# Patient Record
Sex: MALE | Race: OTHER | NOT HISPANIC OR LATINO | URBAN - METROPOLITAN AREA
[De-identification: names, ages, dates, MRNs, and addresses within clinical notes are randomized per-mention and may not be internally consistent; named-entity substitution may affect disease eponyms.]

---

## 2023-11-06 ENCOUNTER — EMERGENCY (EMERGENCY)
Facility: HOSPITAL | Age: 33
LOS: 1 days | Discharge: HOME | End: 2023-11-06
Attending: STUDENT IN AN ORGANIZED HEALTH CARE EDUCATION/TRAINING PROGRAM
Payer: SELF-PAY

## 2023-11-06 VITALS
WEIGHT: 216.49 LBS | SYSTOLIC BLOOD PRESSURE: 139 MMHG | TEMPERATURE: 98 F | DIASTOLIC BLOOD PRESSURE: 82 MMHG | RESPIRATION RATE: 18 BRPM | HEART RATE: 72 BPM | OXYGEN SATURATION: 98 %

## 2023-11-06 PROCEDURE — 99283 EMERGENCY DEPT VISIT LOW MDM: CPT

## 2023-11-06 PROCEDURE — T1013: CPT

## 2023-11-06 PROCEDURE — 99282 EMERGENCY DEPT VISIT SF MDM: CPT

## 2023-11-06 NOTE — ED PROVIDER NOTE - PHYSICAL EXAMINATION
Gen: Nontoxic, well appearing, in NAD.  Skin: Warm and dry as visualized.  Head: NC/AT.  Eyes: Subconjunctival hemorrhage b/l. PERRLA. EOMI.  Neck: Supple, FROM. Trachea midline.   Resp: No distress.  Cardio: Well perfused.  Ext: No deformities. MAEx4. FROM.   Neuro: A&Ox3. Appears nonfocal.   Psych: Normal affect and mood.

## 2023-11-06 NOTE — ED PROVIDER NOTE - OBJECTIVE STATEMENT
31 yo male no PMHx presents to ED c/o eye redness. Reports Saturday night vomited from throwing up. Now with b/l eye redness. No further complaints at this time.   Denies head injury.

## 2023-11-06 NOTE — ED PROVIDER NOTE - CLINICAL SUMMARY MEDICAL DECISION MAKING FREE TEXT BOX
33 yo male no PMHx presents to ED c/o eye redness s/p vomiting after drinking. Patient with subconjunctival hemorrhage. No injury. Medically stable for discharge.

## 2023-11-06 NOTE — ED ADULT TRIAGE NOTE - CHIEF COMPLAINT QUOTE
pt presents to ed and reports  drinking on saturday and threw up with "too much strength and now my eyes are red."  pt states that his head hurts when he looks up.  reports drinking once a week.  denies any vision changes.

## 2023-11-06 NOTE — ED PROVIDER NOTE - PATIENT PORTAL LINK FT
You can access the FollowMyHealth Patient Portal offered by [facility] by registering at the following website: [email or web address]. By joining [employer]’s FollowMyHealth portal, you will also be able to view your health information using other applications (apps) compatible with our system.

## 2023-11-06 NOTE — ED PROVIDER NOTE - NSFOLLOWUPINSTRUCTIONS_ED_ALL_ED_FT
- Please bring all documentation from your ED visit to any related future follow up appointment.  - Please call to schedule follow up appointment with your primary care physician within 24-48 hours.  - Please seek immediate medical attention for any new/worsening, signs/symptoms, or concerns.      - Traiga toda la documentación de maldonado visita al servicio de urgencias a cualquier hilton de seguimiento futura relacionada.  - Llame para programar sobia hilton de seguimiento con maldonado médico de atención primaria dentro de las 24 a 48 horas.  - Busque atención médica inmediata ante cualquier nuevo / empeoramiento, signos / síntomas o inquietudes.      Hemorragia subconjuntival  Subconjunctival Hemorrhage    La hemorragia subconjuntival es el sangrado que se produce entre la parte heather del franchesca (esclerótica) y la membrana transparente que recubre la parte externa de mary órgano (conjuntiva). Cerca de la superficie del franchesca hay muchos vasos sanguíneos diminutos. La hemorragia subconjuntival ocurre cuando jazzy o más de estos vasos sanguíneos se rompen y sangran, lo que deriva en la aparición de sobia nelly alisha en el franchesca. Esta es similar a un patricia.    En función de la magnitud del sangrado, la nelly alisha puede cubrir únicamente sobia pequeña ignacio del franchesca o la totalidad de la parte visible de la esclerótica. Si se acumula mucha lydia debajo de la conjuntiva, también puede sofi inflamación. Las hemorragias subconjuntivales no afectan la visión ni causan dolor, yeimy puede soif sensación de irritación ocular si hay inflamación. En general, las hemorragias subconjuntivales no requieren tratamiento y usualmente, desaparecen solas en el término de dos semanas.    ¿Cuáles son las causas?  Esta afección puede ser causada por lo siguiente:    Un traumatismo leve, vikram frotarse los ojos con mucha fuerza.  Contusiones, vikram por ejemplo practicar deportes o tener contacto con un airbag desplegado.  Toser, estornudar o vomitar.  Realizar esfuerzos, vikram ocurre al levantar un objeto pesado.  Presión arterial braden.  Cirugías recientes del franchesca.  Diabetes.  Algunos medicamentos, especialmente los anticoagulantes.  Otras afecciones, vikram los tumores en los ojos, los trastornos hemorrágicos o las anomalías de los vasos sanguíneos.    Las hemorragias subconjuntivales también pueden producirse sin sobia causa aparente.    ¿Cuáles son los signos o los síntomas?     Los síntomas de esta afección incluyen:    Sobia nelly de color burks oscuro o brillante en la parte heather del franchesca. La ignacio alisha puede:    Extenderse hasta cubrir sobia ignacio más shabbir del franchesca antes de desaparecer.  Tornarse de color marrón amarillento antes de desaparecer.  Hinchazón alrededor del franchesca.  Irritación leve del franchesca.    ¿Cómo se diagnostica?  Esta afección se diagnostica mediante un examen físico. Si la hemorragia subconjuntival fue causada por un traumatismo, el médico puede derivarlo a un oculista (oftalmólogo) o a otro especialista para que lo examinen en busca de otras lesiones. Pueden hacerle otros estudios, por ejemplo:    Examen ocular.  Un control de la presión arterial.  Análisis de lydia para detectar la presencia de trastornos hemorrágicos.    Si la hemorragia subconjuntival fue causada por un traumatismo, pueden hacerle radiografías o sobia exploración por tomografía computarizada (TC) para determinar si hay otras lesiones.    ¿Cómo se trata?  Generalmente, no se requiere un tratamiento para esta afección. Si tiene molestias, el médico puede recomendarle que se aplique gotas oftálmicas o compresas frías.    Siga estas instrucciones en maldonado casa:  [place] los medicamentos de venta rolly y los recetados solamente vikram se lo haya indicado el médico.  Aplique las gotas oftálmicas o las compresas frías para aliviar las molestias vikram se lo haya indicado el médico.  Evite las actividades, las cosas y los entornos que pueden causarle irritación o lesiones en el franchesca.  Concurra a todas las visitas de seguimiento vikram se lo haya indicado el médico. Domino es importante.    Comuníquese con un médico si:  Siente dolor en el franchesca.  El sangrado no desaparece en el término de 3 semanas.  Sigue teniendo nuevas hemorragias subconjuntivales.    Solicite ayuda inmediatamente si:  Tiene cambios en la visión o dificultad para beth.  Repentinamente, tiene mucha sensibilidad a la meeta.  Tiene dolor de nat intenso, vómitos persistentes, confusión o un cansancio que no es normal (letargo).  Parece que el franchesca sobresale o se protruye de la órbita.  Le aparecen moretones en el cuerpo sin motivo.  Tiene sangrado en otra parte del cuerpo sin motivo.    Resumen  La hemorragia subconjuntival es el sangrado que se produce entre la parte heather del franchesca y la membrana transparente que recubre la parte externa de mary órgano.  Esta afección es similar a un moretón.  En general, las hemorragias subconjuntivales no requieren tratamiento y usualmente, desaparecen solas en el término de dos semanas.  Aplique las gotas oftálmicas o las compresas frías para aliviar las molestias vikram se lo haya indicado el médico.    NOTAS ADICIONALES E INSTRUCCIONES    Please follow up with your Primary MD in 24-48 hr.  Seek immediate medical care for any new/worsening signs or symptoms.

## 2023-11-06 NOTE — ED PROVIDER NOTE - NS ED ATTENDING STATEMENT MOD
This was a shared visit with the TIP. I reviewed and verified the documentation and independently performed the documented:

## 2023-11-06 NOTE — ED PROVIDER NOTE - ATTENDING APP SHARED VISIT CONTRIBUTION OF CARE
33 yo male no PMHx presents to ED c/o eye redness after vomiting last night; has a subconjunctival hemorrhage, discussed normal course, return precautions, d/c to outpt follow up